# Patient Record
Sex: OTHER/UNKNOWN | URBAN - METROPOLITAN AREA
[De-identification: names, ages, dates, MRNs, and addresses within clinical notes are randomized per-mention and may not be internally consistent; named-entity substitution may affect disease eponyms.]

---

## 2019-04-01 ENCOUNTER — APPOINTMENT (OUTPATIENT)
Age: 24
Setting detail: DERMATOLOGY
End: 2019-04-02

## 2019-04-01 VITALS
DIASTOLIC BLOOD PRESSURE: 78 MMHG | SYSTOLIC BLOOD PRESSURE: 119 MMHG | HEART RATE: 81 BPM | WEIGHT: 190 LBS | HEIGHT: 60 IN

## 2019-04-01 DIAGNOSIS — L50.1 IDIOPATHIC URTICARIA: ICD-10-CM

## 2019-04-01 DIAGNOSIS — Z12.83 ENCOUNTER FOR SCREENING FOR MALIGNANT NEOPLASM OF SKIN: ICD-10-CM

## 2019-04-01 PROBLEM — J45.909 UNSPECIFIED ASTHMA, UNCOMPLICATED: Status: ACTIVE | Noted: 2019-04-01

## 2019-04-01 PROCEDURE — 99214 OFFICE O/P EST MOD 30 MIN: CPT

## 2019-04-01 PROCEDURE — OTHER COUNSELING: OTHER

## 2019-04-01 PROCEDURE — OTHER MIPS QUALITY: OTHER

## 2019-04-01 PROCEDURE — OTHER PRESCRIPTION: OTHER

## 2019-04-01 RX ORDER — CETIRIZINE HYDROCHLORIDE 10 MG/1
1 TABLET ORAL QD
Qty: 30 | Refills: 11 | Status: ERX

## 2019-04-01 ASSESSMENT — LOCATION SIMPLE DESCRIPTION DERM
LOCATION SIMPLE: RIGHT UPPER ARM
LOCATION SIMPLE: LEFT ARM

## 2019-04-01 ASSESSMENT — LOCATION DETAILED DESCRIPTION DERM
LOCATION DETAILED: LEFT LATERAL ANTECUBITAL SKIN
LOCATION DETAILED: RIGHT ANTERIOR DISTAL UPPER ARM

## 2019-04-01 ASSESSMENT — LOCATION ZONE DERM: LOCATION ZONE: ARM

## 2019-04-01 NOTE — PROCEDURE: COUNSELING
Detail Level: Detailed
Patient Specific Counseling (Will Not Stick From Patient To Patient): Advised patient to see Dr. Vila for further allergy testing\\nDiscussed with patient that most likely cause was her recent stomach flu virus

## 2020-06-15 ENCOUNTER — RX ONLY (RX ONLY)
Age: 25
End: 2020-06-15

## 2020-06-15 ENCOUNTER — APPOINTMENT (OUTPATIENT)
Age: 25
Setting detail: DERMATOLOGY
End: 2020-06-16

## 2020-06-15 VITALS
SYSTOLIC BLOOD PRESSURE: 119 MMHG | WEIGHT: 200 LBS | HEART RATE: 101 BPM | HEIGHT: 60 IN | DIASTOLIC BLOOD PRESSURE: 75 MMHG

## 2020-06-15 DIAGNOSIS — L50.1 IDIOPATHIC URTICARIA: ICD-10-CM

## 2020-06-15 PROCEDURE — 99213 OFFICE O/P EST LOW 20 MIN: CPT

## 2020-06-15 PROCEDURE — OTHER COUNSELING: OTHER

## 2020-06-15 PROCEDURE — OTHER TREATMENT REGIMEN: OTHER

## 2020-06-15 PROCEDURE — OTHER MIPS QUALITY: OTHER

## 2020-06-15 ASSESSMENT — LOCATION ZONE DERM: LOCATION ZONE: ARM

## 2020-06-15 ASSESSMENT — LOCATION SIMPLE DESCRIPTION DERM
LOCATION SIMPLE: LEFT ARM
LOCATION SIMPLE: RIGHT UPPER ARM

## 2020-06-15 ASSESSMENT — LOCATION DETAILED DESCRIPTION DERM
LOCATION DETAILED: RIGHT ANTERIOR DISTAL UPPER ARM
LOCATION DETAILED: LEFT LATERAL ANTECUBITAL SKIN

## 2020-06-15 NOTE — PROCEDURE: TREATMENT REGIMEN
Continue Regimen: All Day Allergy (cetirizine) 10 mg tablet Qd\\nSig: Take one tablet once a day as needed for hives
Detail Level: Zone